# Patient Record
Sex: FEMALE | Race: WHITE | NOT HISPANIC OR LATINO | URBAN - METROPOLITAN AREA
[De-identification: names, ages, dates, MRNs, and addresses within clinical notes are randomized per-mention and may not be internally consistent; named-entity substitution may affect disease eponyms.]

---

## 2017-08-16 ENCOUNTER — OUTPATIENT (OUTPATIENT)
Dept: OUTPATIENT SERVICES | Facility: HOSPITAL | Age: 42
LOS: 1 days | End: 2017-08-16
Payer: MEDICARE

## 2017-08-16 VITALS
OXYGEN SATURATION: 98 % | SYSTOLIC BLOOD PRESSURE: 129 MMHG | RESPIRATION RATE: 16 BRPM | WEIGHT: 199.08 LBS | DIASTOLIC BLOOD PRESSURE: 82 MMHG | HEIGHT: 68 IN | TEMPERATURE: 98 F | HEART RATE: 87 BPM

## 2017-08-16 DIAGNOSIS — K02.62 DENTAL CARIES ON SMOOTH SURFACE PENETRATING INTO DENTIN: ICD-10-CM

## 2017-08-16 DIAGNOSIS — Z01.818 ENCOUNTER FOR OTHER PREPROCEDURAL EXAMINATION: ICD-10-CM

## 2017-08-16 DIAGNOSIS — K05.6 PERIODONTAL DISEASE, UNSPECIFIED: ICD-10-CM

## 2017-08-16 DIAGNOSIS — Z98.890 OTHER SPECIFIED POSTPROCEDURAL STATES: Chronic | ICD-10-CM

## 2017-08-16 PROCEDURE — G0463: CPT

## 2017-08-16 RX ORDER — BENZTROPINE MESYLATE 1 MG
0.5 TABLET ORAL
Qty: 0 | Refills: 0 | COMMUNITY

## 2017-08-16 RX ORDER — LIDOCAINE HCL 20 MG/ML
0.2 VIAL (ML) INJECTION ONCE
Qty: 0 | Refills: 0 | Status: DISCONTINUED | OUTPATIENT
Start: 2017-08-25 | End: 2017-09-09

## 2017-08-16 RX ORDER — SODIUM CHLORIDE 9 MG/ML
3 INJECTION INTRAMUSCULAR; INTRAVENOUS; SUBCUTANEOUS EVERY 8 HOURS
Qty: 0 | Refills: 0 | Status: DISCONTINUED | OUTPATIENT
Start: 2017-08-25 | End: 2017-09-09

## 2017-08-16 NOTE — H&P PST ADULT - NSANTHOSAYNRD_GEN_A_CORE
No. GRACE screening performed.  STOP BANG Legend: 0-2 = LOW Risk; 3-4 = INTERMEDIATE Risk; 5-8 = HIGH Risk

## 2017-08-16 NOTE — H&P PST ADULT - PMH
Anxiety    Dental caries on smooth surface penetrating into dentin    Impulse control disorder in adult    Obesity Anxiety    Dental caries on smooth surface penetrating into dentin    Impulse control disorder in adult    Obesity (BMI 30.0-34.9)

## 2017-08-16 NOTE — H&P PST ADULT - HISTORY OF PRESENT ILLNESS
43 y/o f living in group home, presents pre comprehensive dental procedure under general anesthesia.

## 2017-08-24 RX ORDER — OXYCODONE HYDROCHLORIDE 5 MG/1
5 TABLET ORAL ONCE
Qty: 0 | Refills: 0 | Status: DISCONTINUED | OUTPATIENT
Start: 2017-08-25 | End: 2017-08-25

## 2017-08-24 RX ORDER — ONDANSETRON 8 MG/1
4 TABLET, FILM COATED ORAL ONCE
Qty: 0 | Refills: 0 | Status: DISCONTINUED | OUTPATIENT
Start: 2017-08-25 | End: 2017-09-09

## 2017-08-24 RX ORDER — SODIUM CHLORIDE 9 MG/ML
1000 INJECTION, SOLUTION INTRAVENOUS
Qty: 0 | Refills: 0 | Status: DISCONTINUED | OUTPATIENT
Start: 2017-08-25 | End: 2017-09-09

## 2017-08-24 RX ORDER — CELECOXIB 200 MG/1
200 CAPSULE ORAL ONCE
Qty: 0 | Refills: 0 | Status: DISCONTINUED | OUTPATIENT
Start: 2017-08-25 | End: 2017-09-09

## 2017-08-25 ENCOUNTER — OUTPATIENT (OUTPATIENT)
Dept: OUTPATIENT SERVICES | Facility: HOSPITAL | Age: 42
LOS: 1 days | End: 2017-08-25
Payer: MEDICARE

## 2017-08-25 VITALS
OXYGEN SATURATION: 99 % | SYSTOLIC BLOOD PRESSURE: 122 MMHG | HEART RATE: 72 BPM | DIASTOLIC BLOOD PRESSURE: 56 MMHG | RESPIRATION RATE: 14 BRPM

## 2017-08-25 VITALS
HEART RATE: 94 BPM | WEIGHT: 199.08 LBS | RESPIRATION RATE: 16 BRPM | SYSTOLIC BLOOD PRESSURE: 123 MMHG | DIASTOLIC BLOOD PRESSURE: 83 MMHG | HEIGHT: 68 IN | OXYGEN SATURATION: 97 % | TEMPERATURE: 99 F

## 2017-08-25 DIAGNOSIS — K02.62 DENTAL CARIES ON SMOOTH SURFACE PENETRATING INTO DENTIN: ICD-10-CM

## 2017-08-25 DIAGNOSIS — Z01.818 ENCOUNTER FOR OTHER PREPROCEDURAL EXAMINATION: ICD-10-CM

## 2017-08-25 DIAGNOSIS — Z98.890 OTHER SPECIFIED POSTPROCEDURAL STATES: Chronic | ICD-10-CM

## 2017-08-25 DIAGNOSIS — K05.6 PERIODONTAL DISEASE, UNSPECIFIED: ICD-10-CM

## 2017-08-25 PROCEDURE — D2335: CPT

## 2017-08-25 PROCEDURE — D2391: CPT

## 2017-08-25 PROCEDURE — D2330: CPT

## 2017-08-25 PROCEDURE — D1110: CPT

## 2017-08-25 PROCEDURE — D2331: CPT

## 2017-08-25 PROCEDURE — D2332: CPT

## 2017-08-25 PROCEDURE — D4341: CPT

## 2017-08-25 RX ORDER — BENZTROPINE MESYLATE 1 MG
0.5 TABLET ORAL
Qty: 0 | Refills: 0 | COMMUNITY

## 2017-08-25 RX ORDER — BENZTROPINE MESYLATE 1 MG
2 TABLET ORAL
Qty: 0 | Refills: 0 | COMMUNITY

## 2017-08-25 RX ORDER — ACETAMINOPHEN 500 MG
1000 TABLET ORAL ONCE
Qty: 0 | Refills: 0 | Status: COMPLETED | OUTPATIENT
Start: 2017-08-25 | End: 2017-08-25

## 2017-08-25 RX ADMIN — Medication 400 MILLIGRAM(S): at 13:08

## 2017-08-25 NOTE — BRIEF OPERATIVE NOTE - PRE-OP DX
Dental caries extending into dentin  08/25/2017    Active  Caridad Flores  Periodontal disease  08/25/2017    Active  Caridad Flores

## 2017-08-25 NOTE — ASU PATIENT PROFILE, ADULT - PMH
Anxiety    Dental caries on smooth surface penetrating into dentin    Impulse control disorder in adult    Obesity (BMI 30.0-34.9)

## 2017-08-25 NOTE — BRIEF OPERATIVE NOTE - POST-OP DX
Dental caries extending into dentin  08/25/2017    Caridad Ernandez  Dental caries on smooth surface penetrating into dentin  08/25/2017    Caridad Ernandez  Periodontal disease, unspecified  08/25/2017    Caridad Ernandez

## 2017-08-29 ENCOUNTER — TRANSCRIPTION ENCOUNTER (OUTPATIENT)
Age: 42
End: 2017-08-29

## 2017-11-07 ENCOUNTER — TRANSCRIPTION ENCOUNTER (OUTPATIENT)
Age: 42
End: 2017-11-07

## 2018-02-14 ENCOUNTER — TRANSCRIPTION ENCOUNTER (OUTPATIENT)
Age: 43
End: 2018-02-14

## 2019-02-15 PROBLEM — K02.62 DENTAL CARIES ON SMOOTH SURFACE PENETRATING INTO DENTIN: Chronic | Status: ACTIVE | Noted: 2017-08-16

## 2019-02-15 PROBLEM — F41.9 ANXIETY DISORDER, UNSPECIFIED: Chronic | Status: ACTIVE | Noted: 2017-08-16

## 2019-02-15 PROBLEM — F63.9 IMPULSE DISORDER, UNSPECIFIED: Chronic | Status: ACTIVE | Noted: 2017-08-16

## 2019-02-15 PROBLEM — E66.9 OBESITY, UNSPECIFIED: Chronic | Status: ACTIVE | Noted: 2017-08-16

## 2019-02-19 ENCOUNTER — OUTPATIENT (OUTPATIENT)
Dept: OUTPATIENT SERVICES | Facility: HOSPITAL | Age: 44
LOS: 1 days | End: 2019-02-19
Payer: MEDICARE

## 2019-02-19 VITALS
WEIGHT: 199.96 LBS | OXYGEN SATURATION: 100 % | TEMPERATURE: 99 F | HEIGHT: 68 IN | DIASTOLIC BLOOD PRESSURE: 76 MMHG | HEART RATE: 83 BPM | SYSTOLIC BLOOD PRESSURE: 107 MMHG | RESPIRATION RATE: 14 BRPM

## 2019-02-19 DIAGNOSIS — K05.6 PERIODONTAL DISEASE, UNSPECIFIED: ICD-10-CM

## 2019-02-19 DIAGNOSIS — K02.62 DENTAL CARIES ON SMOOTH SURFACE PENETRATING INTO DENTIN: ICD-10-CM

## 2019-02-19 DIAGNOSIS — F41.9 ANXIETY DISORDER, UNSPECIFIED: ICD-10-CM

## 2019-02-19 DIAGNOSIS — Z01.818 ENCOUNTER FOR OTHER PREPROCEDURAL EXAMINATION: ICD-10-CM

## 2019-02-19 DIAGNOSIS — Z98.890 OTHER SPECIFIED POSTPROCEDURAL STATES: Chronic | ICD-10-CM

## 2019-02-19 PROCEDURE — G0463: CPT

## 2019-02-19 RX ORDER — SODIUM CHLORIDE 9 MG/ML
3 INJECTION INTRAMUSCULAR; INTRAVENOUS; SUBCUTANEOUS EVERY 8 HOURS
Qty: 0 | Refills: 0 | Status: DISCONTINUED | OUTPATIENT
Start: 2019-02-22 | End: 2019-03-09

## 2019-02-19 RX ORDER — FAMOTIDINE 10 MG/ML
0 INJECTION INTRAVENOUS
Qty: 0 | Refills: 0 | COMMUNITY

## 2019-02-19 RX ORDER — LIDOCAINE HCL 20 MG/ML
0.2 VIAL (ML) INJECTION ONCE
Qty: 0 | Refills: 0 | Status: DISCONTINUED | OUTPATIENT
Start: 2019-02-22 | End: 2019-03-09

## 2019-02-19 NOTE — H&P PST ADULT - PSY GEN HX ROS MEA POS PC
anxiety anxiety/experiences "chest pain" when extremely anxious, has not happened in over 1-2 months.

## 2019-02-19 NOTE — H&P PST ADULT - HISTORY OF PRESENT ILLNESS
Ms. Garcia is a 43 year old woman with PMH impulse control disorder, anxiety and dental caries who is scheduled for comprehensive dental treatment. Ms. Garcia is a 43 year old woman with PMH impulse control disorder who lives in a group home, anxiety and dental caries who is scheduled for comprehensive dental treatment.

## 2019-02-19 NOTE — H&P PST ADULT - ENMT COMMENTS
finger rub heard bilaterally, no redness/inflammation of mouth/pharynx denies dysphagia, choking or history of aspiration pneumonia

## 2019-02-19 NOTE — H&P PST ADULT - REASON FOR ADMISSION
Deep cleaning and possible removal of R upper molar Deep cleaning of teeth and possible removal of R upper molar

## 2019-02-21 ENCOUNTER — TRANSCRIPTION ENCOUNTER (OUTPATIENT)
Age: 44
End: 2019-02-21

## 2019-02-21 RX ORDER — ONDANSETRON 8 MG/1
4 TABLET, FILM COATED ORAL ONCE
Qty: 0 | Refills: 0 | Status: DISCONTINUED | OUTPATIENT
Start: 2019-02-22 | End: 2019-03-09

## 2019-02-21 RX ORDER — SODIUM CHLORIDE 9 MG/ML
1000 INJECTION, SOLUTION INTRAVENOUS
Qty: 0 | Refills: 0 | Status: DISCONTINUED | OUTPATIENT
Start: 2019-02-22 | End: 2019-03-09

## 2019-02-22 ENCOUNTER — OUTPATIENT (OUTPATIENT)
Dept: OUTPATIENT SERVICES | Facility: HOSPITAL | Age: 44
LOS: 1 days | Discharge: HOME | End: 2019-02-22
Payer: MEDICARE

## 2019-02-22 VITALS
RESPIRATION RATE: 18 BRPM | DIASTOLIC BLOOD PRESSURE: 80 MMHG | HEIGHT: 68 IN | WEIGHT: 199.96 LBS | HEART RATE: 89 BPM | OXYGEN SATURATION: 97 % | SYSTOLIC BLOOD PRESSURE: 116 MMHG | TEMPERATURE: 97 F

## 2019-02-22 VITALS
HEART RATE: 80 BPM | SYSTOLIC BLOOD PRESSURE: 110 MMHG | TEMPERATURE: 99 F | DIASTOLIC BLOOD PRESSURE: 70 MMHG | OXYGEN SATURATION: 98 % | RESPIRATION RATE: 15 BRPM

## 2019-02-22 DIAGNOSIS — Z01.818 ENCOUNTER FOR OTHER PREPROCEDURAL EXAMINATION: ICD-10-CM

## 2019-02-22 DIAGNOSIS — Z98.890 OTHER SPECIFIED POSTPROCEDURAL STATES: Chronic | ICD-10-CM

## 2019-02-22 DIAGNOSIS — K05.6 PERIODONTAL DISEASE, UNSPECIFIED: ICD-10-CM

## 2019-02-22 DIAGNOSIS — K02.62 DENTAL CARIES ON SMOOTH SURFACE PENETRATING INTO DENTIN: ICD-10-CM

## 2019-02-22 PROCEDURE — D2335: CPT

## 2019-02-22 PROCEDURE — D2394: CPT

## 2019-02-22 PROCEDURE — D2332: CPT

## 2019-02-22 PROCEDURE — D4342: CPT

## 2019-02-22 RX ORDER — CITRIC ACID/SODIUM CITRATE 300-500 MG
30 SOLUTION, ORAL ORAL ONCE
Qty: 0 | Refills: 0 | Status: COMPLETED | OUTPATIENT
Start: 2019-02-22 | End: 2019-02-22

## 2019-02-22 RX ADMIN — Medication 15 MILLILITER(S): at 09:08

## 2019-02-22 NOTE — BRIEF OPERATIVE NOTE - PROCEDURE
<<-----Click on this checkbox to enter Procedure Dental exam, with x-ray imaging, dental cleaning, and restoration  02/22/2019    Active  DRE

## 2019-02-22 NOTE — PRE-ANESTHESIA EVALUATION ADULT - LAST ECHOCARDIOGRAM
na Dorsal Nasal Flap Text: The defect edges were debeveled with a #15 scalpel blade.  Given the location of the defect and the proximity to free margins a dorsal nasal flap was deemed most appropriate.  Using a sterile surgical marker, an appropriate dorsal nasal flap was drawn around the defect.    The area thus outlined was incised deep to adipose tissue with a #15 scalpel blade.  The skin margins were undermined to an appropriate distance in all directions utilizing iris scissors.

## 2019-02-22 NOTE — ASU DISCHARGE PLAN (ADULT/PEDIATRIC). - NOTIFY
Swelling that continues/Persistent Nausea and Vomiting/Inability to Tolerate Liquids or Foods/Pain not relieved by Medications/Fever greater than 101/Unable to Urinate/Bleeding that does not stop

## 2019-03-08 DIAGNOSIS — K02.9 DENTAL CARIES, UNSPECIFIED: ICD-10-CM

## 2019-03-08 DIAGNOSIS — F63.9 IMPULSE DISORDER, UNSPECIFIED: ICD-10-CM

## 2019-03-08 DIAGNOSIS — F41.9 ANXIETY DISORDER, UNSPECIFIED: ICD-10-CM

## 2019-10-07 ENCOUNTER — OUTPATIENT (OUTPATIENT)
Dept: OUTPATIENT SERVICES | Facility: HOSPITAL | Age: 44
LOS: 1 days | End: 2019-10-07
Payer: MEDICARE

## 2019-10-07 VITALS
OXYGEN SATURATION: 97 % | TEMPERATURE: 98 F | SYSTOLIC BLOOD PRESSURE: 118 MMHG | DIASTOLIC BLOOD PRESSURE: 79 MMHG | WEIGHT: 201.06 LBS | HEART RATE: 78 BPM | HEIGHT: 68 IN | RESPIRATION RATE: 18 BRPM

## 2019-10-07 DIAGNOSIS — Z98.890 OTHER SPECIFIED POSTPROCEDURAL STATES: Chronic | ICD-10-CM

## 2019-10-07 DIAGNOSIS — K02.9 DENTAL CARIES, UNSPECIFIED: ICD-10-CM

## 2019-10-07 DIAGNOSIS — K02.62 DENTAL CARIES ON SMOOTH SURFACE PENETRATING INTO DENTIN: ICD-10-CM

## 2019-10-07 DIAGNOSIS — Z01.818 ENCOUNTER FOR OTHER PREPROCEDURAL EXAMINATION: ICD-10-CM

## 2019-10-07 DIAGNOSIS — K05.6 PERIODONTAL DISEASE, UNSPECIFIED: ICD-10-CM

## 2019-10-07 LAB
ANION GAP SERPL CALC-SCNC: 12 MMOL/L — SIGNIFICANT CHANGE UP (ref 5–17)
BUN SERPL-MCNC: 10 MG/DL — SIGNIFICANT CHANGE UP (ref 7–23)
CALCIUM SERPL-MCNC: 9.1 MG/DL — SIGNIFICANT CHANGE UP (ref 8.4–10.5)
CHLORIDE SERPL-SCNC: 105 MMOL/L — SIGNIFICANT CHANGE UP (ref 96–108)
CO2 SERPL-SCNC: 21 MMOL/L — LOW (ref 22–31)
CREAT SERPL-MCNC: 0.92 MG/DL — SIGNIFICANT CHANGE UP (ref 0.5–1.3)
GLUCOSE SERPL-MCNC: 90 MG/DL — SIGNIFICANT CHANGE UP (ref 70–99)
HCT VFR BLD CALC: 38.4 % — SIGNIFICANT CHANGE UP (ref 34.5–45)
HGB BLD-MCNC: 12.1 G/DL — SIGNIFICANT CHANGE UP (ref 11.5–15.5)
MCHC RBC-ENTMCNC: 29 PG — SIGNIFICANT CHANGE UP (ref 27–34)
MCHC RBC-ENTMCNC: 31.5 GM/DL — LOW (ref 32–36)
MCV RBC AUTO: 92.1 FL — SIGNIFICANT CHANGE UP (ref 80–100)
PLATELET # BLD AUTO: 331 K/UL — SIGNIFICANT CHANGE UP (ref 150–400)
POTASSIUM SERPL-MCNC: 4.1 MMOL/L — SIGNIFICANT CHANGE UP (ref 3.5–5.3)
POTASSIUM SERPL-SCNC: 4.1 MMOL/L — SIGNIFICANT CHANGE UP (ref 3.5–5.3)
RBC # BLD: 4.17 M/UL — SIGNIFICANT CHANGE UP (ref 3.8–5.2)
RBC # FLD: 13.6 % — SIGNIFICANT CHANGE UP (ref 10.3–14.5)
SODIUM SERPL-SCNC: 138 MMOL/L — SIGNIFICANT CHANGE UP (ref 135–145)
WBC # BLD: 7.22 K/UL — SIGNIFICANT CHANGE UP (ref 3.8–10.5)
WBC # FLD AUTO: 7.22 K/UL — SIGNIFICANT CHANGE UP (ref 3.8–10.5)

## 2019-10-07 PROCEDURE — 85027 COMPLETE CBC AUTOMATED: CPT

## 2019-10-07 PROCEDURE — G0463: CPT

## 2019-10-07 PROCEDURE — 80048 BASIC METABOLIC PNL TOTAL CA: CPT

## 2019-10-07 RX ORDER — BENZTROPINE MESYLATE 1 MG
2 TABLET ORAL
Qty: 0 | Refills: 0 | DISCHARGE

## 2019-10-07 RX ORDER — OMEGA-3 ACID ETHYL ESTERS 1 G
1 CAPSULE ORAL
Qty: 0 | Refills: 0 | DISCHARGE

## 2019-10-07 RX ORDER — BENZTROPINE MESYLATE 1 MG
0.5 TABLET ORAL
Qty: 0 | Refills: 0 | DISCHARGE

## 2019-10-07 RX ORDER — BENZTROPINE MESYLATE 1 MG
1 TABLET ORAL
Qty: 0 | Refills: 0 | DISCHARGE

## 2019-10-07 RX ORDER — BENZTROPINE MESYLATE 1 MG
2.5 TABLET ORAL
Qty: 0 | Refills: 0 | DISCHARGE

## 2019-10-07 NOTE — H&P PST ADULT - NSICDXPROBLEM_GEN_ALL_CORE_FT
PROBLEM DIAGNOSES  Problem: Dental caries  Assessment and Plan: comprehensive dental eatment under anesthesia PROBLEM DIAGNOSES  Problem: Dental caries  Assessment and Plan: comprehensive dental treatment under anesthesia  pt will sign her own consent

## 2019-10-07 NOTE — H&P PST ADULT - HISTORY OF PRESENT ILLNESS
This is a 44 year old female who has two loose teeth on bottom.  Pt has had dental surgery in the past in 2016 This is a 44 year old female who has two loose teeth on bottom.  Pt has had dental surgery in the past in 2016.  Pt states her two front teeth on bottom are loose and need to be removed.  Now scheduled for comprehensive dental treatment on 10-11-19

## 2019-10-07 NOTE — H&P PST ADULT - NSICDXPASTSURGICALHX_GEN_ALL_CORE_FT
PAST SURGICAL HISTORY:  H/O arthroscopy of knee 2012    H/O arthroscopy of knee 9-2019  left wearing brace to help foot turn inward

## 2019-10-07 NOTE — H&P PST ADULT - NSICDXPASTMEDICALHX_GEN_ALL_CORE_FT
PAST MEDICAL HISTORY:  Adjustment disorder with disturbance of conduct    Anxiety     Astigmatism of eye both eyes    Autism     Dental caries on smooth surface penetrating into dentin     Impulse control disorder in adult     Intellectual disability     Mood disorder     Obesity (BMI 30.0-34.9)     Pervasive developmental disorder     Self-injurious behavior not seen at pst    Tear of meniscus of knee

## 2019-10-10 ENCOUNTER — TRANSCRIPTION ENCOUNTER (OUTPATIENT)
Age: 44
End: 2019-10-10

## 2019-10-11 ENCOUNTER — OUTPATIENT (OUTPATIENT)
Dept: OUTPATIENT SERVICES | Facility: HOSPITAL | Age: 44
LOS: 1 days | End: 2019-10-11
Payer: MEDICARE

## 2019-10-11 VITALS
TEMPERATURE: 98 F | WEIGHT: 201.06 LBS | HEART RATE: 79 BPM | RESPIRATION RATE: 18 BRPM | DIASTOLIC BLOOD PRESSURE: 81 MMHG | HEIGHT: 68 IN | OXYGEN SATURATION: 100 % | SYSTOLIC BLOOD PRESSURE: 138 MMHG

## 2019-10-11 VITALS
DIASTOLIC BLOOD PRESSURE: 74 MMHG | RESPIRATION RATE: 16 BRPM | OXYGEN SATURATION: 98 % | SYSTOLIC BLOOD PRESSURE: 114 MMHG | HEART RATE: 80 BPM

## 2019-10-11 DIAGNOSIS — K02.62 DENTAL CARIES ON SMOOTH SURFACE PENETRATING INTO DENTIN: ICD-10-CM

## 2019-10-11 DIAGNOSIS — Z98.890 OTHER SPECIFIED POSTPROCEDURAL STATES: Chronic | ICD-10-CM

## 2019-10-11 DIAGNOSIS — K05.6 PERIODONTAL DISEASE, UNSPECIFIED: ICD-10-CM

## 2019-10-11 PROCEDURE — D1110: CPT

## 2019-10-11 PROCEDURE — D4341: CPT

## 2019-10-11 PROCEDURE — D2335: CPT

## 2019-10-11 PROCEDURE — D7210: CPT

## 2019-10-11 PROCEDURE — C1889: CPT

## 2019-10-11 RX ORDER — SODIUM CHLORIDE 9 MG/ML
1000 INJECTION, SOLUTION INTRAVENOUS
Refills: 0 | Status: DISCONTINUED | OUTPATIENT
Start: 2019-10-11 | End: 2019-10-28

## 2019-10-11 RX ORDER — ONDANSETRON 8 MG/1
4 TABLET, FILM COATED ORAL ONCE
Refills: 0 | Status: DISCONTINUED | OUTPATIENT
Start: 2019-10-11 | End: 2019-10-28

## 2019-10-11 NOTE — ASU DISCHARGE PLAN (ADULT/PEDIATRIC) - ASU DC SPECIAL INSTRUCTIONSFT
comprehensive dental tx under ga performed    tylenol prn pain    return to routine activity tomorrow    see Dr Flores in 1-2 weeks for a follow up

## 2019-10-11 NOTE — ASU PATIENT PROFILE, ADULT - PMH
Adjustment disorder  with disturbance of conduct  Anxiety    Astigmatism of eye  both eyes  Autism    Dental caries on smooth surface penetrating into dentin    Impulse control disorder in adult    Intellectual disability    Mood disorder    Obesity (BMI 30.0-34.9)    Pervasive developmental disorder    Self-injurious behavior  not seen at pst  Tear of meniscus of knee

## 2019-10-11 NOTE — ASU PATIENT PROFILE, ADULT - PSH
H/O arthroscopy of knee  9-2019  left wearing brace to help foot turn inward  H/O arthroscopy of knee  2012

## 2022-01-05 ENCOUNTER — TRANSCRIPTION ENCOUNTER (OUTPATIENT)
Age: 47
End: 2022-01-05

## 2022-02-21 NOTE — H&P PST ADULT - NS PRO ABUSE SCREEN SUSPICION NEGLECT YN
Please let Macy know Dexa shows osteopenia, which is early bone loss.  advise daily weight bearing exercise, ensure that you are getting 4568-8740 mg of calcium in your diet + vit D ~ 1000 IU daily    Repeat in 2 years    no

## 2022-07-23 ENCOUNTER — NON-APPOINTMENT (OUTPATIENT)
Age: 47
End: 2022-07-23

## 2023-03-30 PROBLEM — F84.0 AUTISTIC DISORDER: Chronic | Status: ACTIVE | Noted: 2019-10-07

## 2023-03-30 PROBLEM — F84.9 PERVASIVE DEVELOPMENTAL DISORDER, UNSPECIFIED: Chronic | Status: ACTIVE | Noted: 2019-10-07

## 2023-03-30 PROBLEM — Z72.89 OTHER PROBLEMS RELATED TO LIFESTYLE: Chronic | Status: ACTIVE | Noted: 2019-10-07

## 2023-03-30 PROBLEM — F39 UNSPECIFIED MOOD [AFFECTIVE] DISORDER: Chronic | Status: ACTIVE | Noted: 2019-10-07

## 2023-03-30 PROBLEM — H52.209 UNSPECIFIED ASTIGMATISM, UNSPECIFIED EYE: Chronic | Status: ACTIVE | Noted: 2019-10-07

## 2023-03-30 PROBLEM — S83.209A UNSPECIFIED TEAR OF UNSPECIFIED MENISCUS, CURRENT INJURY, UNSPECIFIED KNEE, INITIAL ENCOUNTER: Chronic | Status: ACTIVE | Noted: 2019-10-07

## 2023-03-30 PROBLEM — F79 UNSPECIFIED INTELLECTUAL DISABILITIES: Chronic | Status: ACTIVE | Noted: 2019-10-07

## 2023-03-30 PROBLEM — F43.20 ADJUSTMENT DISORDER, UNSPECIFIED: Chronic | Status: ACTIVE | Noted: 2019-10-07

## 2023-04-07 ENCOUNTER — OUTPATIENT (OUTPATIENT)
Dept: OUTPATIENT SERVICES | Facility: HOSPITAL | Age: 48
LOS: 1 days | End: 2023-04-07
Payer: MEDICARE

## 2023-04-07 VITALS
SYSTOLIC BLOOD PRESSURE: 122 MMHG | RESPIRATION RATE: 20 BRPM | TEMPERATURE: 98 F | DIASTOLIC BLOOD PRESSURE: 84 MMHG | WEIGHT: 179.02 LBS | HEIGHT: 68 IN | OXYGEN SATURATION: 98 % | HEART RATE: 64 BPM

## 2023-04-07 DIAGNOSIS — Z98.890 OTHER SPECIFIED POSTPROCEDURAL STATES: Chronic | ICD-10-CM

## 2023-04-07 DIAGNOSIS — K05.6 PERIODONTAL DISEASE, UNSPECIFIED: ICD-10-CM

## 2023-04-07 DIAGNOSIS — K02.62 DENTAL CARIES ON SMOOTH SURFACE PENETRATING INTO DENTIN: ICD-10-CM

## 2023-04-07 DIAGNOSIS — Z01.818 ENCOUNTER FOR OTHER PREPROCEDURAL EXAMINATION: ICD-10-CM

## 2023-04-07 LAB
ANION GAP SERPL CALC-SCNC: 13 MMOL/L — SIGNIFICANT CHANGE UP (ref 5–17)
BUN SERPL-MCNC: 14 MG/DL — SIGNIFICANT CHANGE UP (ref 7–23)
CALCIUM SERPL-MCNC: 9.7 MG/DL — SIGNIFICANT CHANGE UP (ref 8.4–10.5)
CHLORIDE SERPL-SCNC: 106 MMOL/L — SIGNIFICANT CHANGE UP (ref 96–108)
CO2 SERPL-SCNC: 22 MMOL/L — SIGNIFICANT CHANGE UP (ref 22–31)
CREAT SERPL-MCNC: 0.86 MG/DL — SIGNIFICANT CHANGE UP (ref 0.5–1.3)
EGFR: 83 ML/MIN/1.73M2 — SIGNIFICANT CHANGE UP
GLUCOSE SERPL-MCNC: 81 MG/DL — SIGNIFICANT CHANGE UP (ref 70–99)
HCT VFR BLD CALC: 40.3 % — SIGNIFICANT CHANGE UP (ref 34.5–45)
HGB BLD-MCNC: 12.6 G/DL — SIGNIFICANT CHANGE UP (ref 11.5–15.5)
MCHC RBC-ENTMCNC: 28.7 PG — SIGNIFICANT CHANGE UP (ref 27–34)
MCHC RBC-ENTMCNC: 31.3 GM/DL — LOW (ref 32–36)
MCV RBC AUTO: 91.8 FL — SIGNIFICANT CHANGE UP (ref 80–100)
NRBC # BLD: 0 /100 WBCS — SIGNIFICANT CHANGE UP (ref 0–0)
PLATELET # BLD AUTO: 280 K/UL — SIGNIFICANT CHANGE UP (ref 150–400)
POTASSIUM SERPL-MCNC: 4.2 MMOL/L — SIGNIFICANT CHANGE UP (ref 3.5–5.3)
POTASSIUM SERPL-SCNC: 4.2 MMOL/L — SIGNIFICANT CHANGE UP (ref 3.5–5.3)
RBC # BLD: 4.39 M/UL — SIGNIFICANT CHANGE UP (ref 3.8–5.2)
RBC # FLD: 13.6 % — SIGNIFICANT CHANGE UP (ref 10.3–14.5)
SODIUM SERPL-SCNC: 141 MMOL/L — SIGNIFICANT CHANGE UP (ref 135–145)
WBC # BLD: 6.76 K/UL — SIGNIFICANT CHANGE UP (ref 3.8–10.5)
WBC # FLD AUTO: 6.76 K/UL — SIGNIFICANT CHANGE UP (ref 3.8–10.5)

## 2023-04-07 PROCEDURE — 36415 COLL VENOUS BLD VENIPUNCTURE: CPT

## 2023-04-07 PROCEDURE — 80048 BASIC METABOLIC PNL TOTAL CA: CPT

## 2023-04-07 PROCEDURE — G0463: CPT

## 2023-04-07 PROCEDURE — 85027 COMPLETE CBC AUTOMATED: CPT

## 2023-04-07 RX ORDER — LIDOCAINE HCL 20 MG/ML
0.2 VIAL (ML) INJECTION ONCE
Refills: 0 | Status: DISCONTINUED | OUTPATIENT
Start: 2023-04-28 | End: 2023-05-13

## 2023-04-07 RX ORDER — NYSTATIN CREAM 100000 [USP'U]/G
1 CREAM TOPICAL
Qty: 0 | Refills: 0 | DISCHARGE

## 2023-04-07 RX ORDER — BENZTROPINE MESYLATE 1 MG
2.5 TABLET ORAL
Qty: 0 | Refills: 0 | DISCHARGE

## 2023-04-07 RX ORDER — SODIUM CHLORIDE 9 MG/ML
1000 INJECTION, SOLUTION INTRAVENOUS
Refills: 0 | Status: DISCONTINUED | OUTPATIENT
Start: 2023-04-28 | End: 2023-05-13

## 2023-04-07 NOTE — H&P PST ADULT - NSANTHOSAYNRD_GEN_A_CORE
No. GARCE screening performed.  STOP BANG Legend: 0-2 = LOW Risk; 3-4 = INTERMEDIATE Risk; 5-8 = HIGH Risk

## 2023-04-07 NOTE — H&P PST ADULT - PROBLEM SELECTOR PLAN 1
Scheduled for comprehensive dental treatment under anesthesia   preop instruction provided verbally and in written, pt and staff verbalized understanding   ucg on admit

## 2023-04-07 NOTE — H&P PST ADULT - ASSESSMENT
DASI SCORE  DASI ACTIVITY  MALLAMPATI 57 DASI SCORE 6.69  DASI ACTIVITY- walking, goes to day program, able to walk uphill without SOB  MALLAMPATI 2  PATIENT IS SCHEDULED FOR COMPREHENSIVE DENTAL EXAM UNDER ANESTHESIA

## 2023-04-07 NOTE — H&P PST ADULT - NSICDXPASTMEDICALHX_GEN_ALL_CORE_FT
PAST MEDICAL HISTORY:  Adjustment disorder with disturbance of conduct    Anxiety     Astigmatism of eye both eyes    Autism     Dental caries on smooth surface penetrating into dentin     Impulse control disorder in adult     Intellectual disability     Mood disorder     Obesity (BMI 30.0-34.9)     Pervasive developmental disorder     Self-injurious behavior not seen at pst    Tear of meniscus of knee      PAST MEDICAL HISTORY:  Adjustment disorder with disturbance of conduct    Anxiety     Astigmatism of eye both eyes    Autism     Dental caries on smooth surface penetrating into dentin     History of migraine headaches     Impulse control disorder in adult     Intellectual disability     Mood disorder     Obesity (BMI 30.0-34.9)     Pervasive developmental disorder     Self-injurious behavior not seen at pst    Tear of meniscus of knee

## 2023-04-07 NOTE — H&P PST ADULT - NSICDXPROCEDURE_GEN_ALL_CORE_FT
PROCEDURES:  Simple dental restoration 07-Apr-2023 14:10:42 peridontal disease/ dental caries smooth surface penetration into dentin Kayla Sorto

## 2023-04-07 NOTE — H&P PST ADULT - HISTORY OF PRESENT ILLNESS
47 year old female with mild intellectual disability, accompanied with group home aide, presents for preop testing for scheduled comprehensive dental treatment under anesthesia on 4/28/2023. Her history significant for Autism, mood disorder, impulse control disorder, migraine headaches.

## 2023-04-07 NOTE — H&P PST ADULT - NSANTHGENDERRD_ENT_A_CORE
If not already tried, can first try taking it with food.      Other option, can decrease to 200 mg daily with or without food (see which way is better tolerated), thereafter if well-tolerated after 2 weeks can increase to 200 mg 1 day alternating the next day with 200 mg twice a day, thereafter if well-tolerated after 2 weeks can increase to 200 mg twice a day.  Sometimes patients can develop tolerance to Plaquenil with time hence, the above regimen suggested.    If still experiencing nausea or significant fatigue even with low-dose Plaquenil (200 mg daily) options include: Continuing this medication with an antinausea medication to take daily as needed or switching to methotrexate.  Please send patient a handout on methotrexate.      Feel better!           No

## 2023-04-15 ENCOUNTER — EMERGENCY (EMERGENCY)
Facility: HOSPITAL | Age: 48
LOS: 1 days | Discharge: HOME | End: 2023-04-15
Attending: INTERNAL MEDICINE | Admitting: INTERNAL MEDICINE
Payer: MEDICARE

## 2023-04-15 VITALS
DIASTOLIC BLOOD PRESSURE: 94 MMHG | SYSTOLIC BLOOD PRESSURE: 144 MMHG | HEART RATE: 73 BPM | HEIGHT: 68 IN | RESPIRATION RATE: 16 BRPM | OXYGEN SATURATION: 99 % | TEMPERATURE: 98 F | WEIGHT: 181 LBS

## 2023-04-15 DIAGNOSIS — Z98.890 OTHER SPECIFIED POSTPROCEDURAL STATES: Chronic | ICD-10-CM

## 2023-04-15 LAB
ALBUMIN SERPL ELPH-MCNC: 3.3 G/DL — SIGNIFICANT CHANGE UP (ref 3.3–5)
ALP SERPL-CCNC: 82 U/L — SIGNIFICANT CHANGE UP (ref 40–120)
ALT FLD-CCNC: 22 U/L — SIGNIFICANT CHANGE UP (ref 12–78)
ANION GAP SERPL CALC-SCNC: 3 MMOL/L — LOW (ref 5–17)
AST SERPL-CCNC: 33 U/L — SIGNIFICANT CHANGE UP (ref 15–37)
BASOPHILS # BLD AUTO: 0.05 K/UL — SIGNIFICANT CHANGE UP (ref 0–0.2)
BASOPHILS NFR BLD AUTO: 0.7 % — SIGNIFICANT CHANGE UP (ref 0–2)
BILIRUB SERPL-MCNC: 0.3 MG/DL — SIGNIFICANT CHANGE UP (ref 0.2–1.2)
BUN SERPL-MCNC: 9 MG/DL — SIGNIFICANT CHANGE UP (ref 7–23)
CALCIUM SERPL-MCNC: 8.7 MG/DL — SIGNIFICANT CHANGE UP (ref 8.5–10.1)
CHLORIDE SERPL-SCNC: 111 MMOL/L — HIGH (ref 96–108)
CO2 SERPL-SCNC: 25 MMOL/L — SIGNIFICANT CHANGE UP (ref 22–31)
CREAT SERPL-MCNC: 0.9 MG/DL — SIGNIFICANT CHANGE UP (ref 0.5–1.3)
D DIMER BLD IA.RAPID-MCNC: 273 NG/ML DDU — HIGH
EGFR: 79 ML/MIN/1.73M2 — SIGNIFICANT CHANGE UP
EOSINOPHIL # BLD AUTO: 0.34 K/UL — SIGNIFICANT CHANGE UP (ref 0–0.5)
EOSINOPHIL NFR BLD AUTO: 4.5 % — SIGNIFICANT CHANGE UP (ref 0–6)
FLUAV AG NPH QL: SIGNIFICANT CHANGE UP
FLUBV AG NPH QL: SIGNIFICANT CHANGE UP
GLUCOSE SERPL-MCNC: 92 MG/DL — SIGNIFICANT CHANGE UP (ref 70–99)
HCG SERPL-ACNC: <1 MIU/ML — SIGNIFICANT CHANGE UP
HCT VFR BLD CALC: 35.1 % — SIGNIFICANT CHANGE UP (ref 34.5–45)
HGB BLD-MCNC: 11.3 G/DL — LOW (ref 11.5–15.5)
IMM GRANULOCYTES NFR BLD AUTO: 0.1 % — SIGNIFICANT CHANGE UP (ref 0–0.9)
LYMPHOCYTES # BLD AUTO: 3.36 K/UL — HIGH (ref 1–3.3)
LYMPHOCYTES # BLD AUTO: 44.2 % — HIGH (ref 13–44)
MCHC RBC-ENTMCNC: 29.4 PG — SIGNIFICANT CHANGE UP (ref 27–34)
MCHC RBC-ENTMCNC: 32.2 GM/DL — SIGNIFICANT CHANGE UP (ref 32–36)
MCV RBC AUTO: 91.2 FL — SIGNIFICANT CHANGE UP (ref 80–100)
MONOCYTES # BLD AUTO: 0.69 K/UL — SIGNIFICANT CHANGE UP (ref 0–0.9)
MONOCYTES NFR BLD AUTO: 9.1 % — SIGNIFICANT CHANGE UP (ref 2–14)
NEUTROPHILS # BLD AUTO: 3.15 K/UL — SIGNIFICANT CHANGE UP (ref 1.8–7.4)
NEUTROPHILS NFR BLD AUTO: 41.4 % — LOW (ref 43–77)
NRBC # BLD: 0 /100 WBCS — SIGNIFICANT CHANGE UP (ref 0–0)
PLATELET # BLD AUTO: 259 K/UL — SIGNIFICANT CHANGE UP (ref 150–400)
POTASSIUM SERPL-MCNC: 5 MMOL/L — SIGNIFICANT CHANGE UP (ref 3.5–5.3)
POTASSIUM SERPL-SCNC: 5 MMOL/L — SIGNIFICANT CHANGE UP (ref 3.5–5.3)
PROT SERPL-MCNC: 7.1 G/DL — SIGNIFICANT CHANGE UP (ref 6–8.3)
RBC # BLD: 3.85 M/UL — SIGNIFICANT CHANGE UP (ref 3.8–5.2)
RBC # FLD: 13.7 % — SIGNIFICANT CHANGE UP (ref 10.3–14.5)
RSV RNA NPH QL NAA+NON-PROBE: SIGNIFICANT CHANGE UP
SARS-COV-2 RNA SPEC QL NAA+PROBE: SIGNIFICANT CHANGE UP
SODIUM SERPL-SCNC: 139 MMOL/L — SIGNIFICANT CHANGE UP (ref 135–145)
TROPONIN I, HIGH SENSITIVITY RESULT: 29.3 NG/L — SIGNIFICANT CHANGE UP
WBC # BLD: 7.6 K/UL — SIGNIFICANT CHANGE UP (ref 3.8–10.5)
WBC # FLD AUTO: 7.6 K/UL — SIGNIFICANT CHANGE UP (ref 3.8–10.5)

## 2023-04-15 PROCEDURE — 71045 X-RAY EXAM CHEST 1 VIEW: CPT | Mod: 26

## 2023-04-15 PROCEDURE — 99285 EMERGENCY DEPT VISIT HI MDM: CPT

## 2023-04-15 PROCEDURE — 93010 ELECTROCARDIOGRAM REPORT: CPT

## 2023-04-15 RX ORDER — SODIUM CHLORIDE 9 MG/ML
1000 INJECTION INTRAMUSCULAR; INTRAVENOUS; SUBCUTANEOUS ONCE
Refills: 0 | Status: COMPLETED | OUTPATIENT
Start: 2023-04-15 | End: 2023-04-15

## 2023-04-15 RX ADMIN — SODIUM CHLORIDE 1000 MILLILITER(S): 9 INJECTION INTRAMUSCULAR; INTRAVENOUS; SUBCUTANEOUS at 23:05

## 2023-04-15 NOTE — ED PROVIDER NOTE - CARE PROVIDER_API CALL
Jayden Fischer)  Internal Medicine  [address] [place] [address]  Phone: [phone]  Fax: [phone]  Follow Up Time: 1-3 Days

## 2023-04-15 NOTE — ED ADULT NURSE NOTE - NSFALLRSKASSESSTYPE_ED_ALL_ED
Date of Service: 10/16/2018    HISTORY OF PRESENT ILLNESS:  Delia is a 74-year-old female with multiple medical problems who presents today for a follow-up appointment. Her abdominal pain has subsided significantly. She was seen by Dr. Jacklyn Teran NP and they did switch her from Ranitidine to Omeprazole. She does think though that being off the Victoza might have helped her as she is off the Victoza now. Her A1c was 5.7 and this is without any medications now for diabetes. She has lost a significant amount of weight and changed her diet. Her blood pressure is good. She is only on Cozaar 50 mg a day and her blood pressure is 112/56. She is no longer having periods of lightheadedness and dizziness as she was. She was on Lexapro. Initially we put her on that for her stomach as well as for anxiety and depression. It does seem to have helped her. She has a history of gout and every now and then her feet and ankles do bother her. She is on Allopurinol. We did not check a uric acid level. She does see Dr. Emery Wright in regards to her coronary artery disease. She also sees Dr. Maki Ozuna in regards to her chronic kidney disease. She has a UTI and is on medication. She is going to have a UA checked after she has finished the antibiotics. She has a history of chronic anemia, which is related to chronic disease, probably partially her renal disease. She is on B12 and an iron supplementation also. CURRENT MEDICATIONS:  1. Allopurinol 100 mg b.i.d.  2.  Lasix 20 mg daily. 3.  Cozaar 50 mg a day. 4.  Lopressor 25 mg. She takes a half a tablet daily. 5.  Zocor 40 mg daily. 6.  Lexapro 10 mg a day. 7.  Fiber tablets. 8.  Prilosec 20 mg daily. 9.  Ditropan 5 mg b.i.d. 10.  Eliquis 5 mg b.i.d.  11.  Potassium 10 mEq daily. 12.  Multivitamin daily. 13.  Flonase p.r.n. 14.  Sulfa. PHYSICAL EXAMINATION:  VITAL SIGNS:  Blood pressure 112/56, heart rate 45, pulse ox 98%. Weight is 76.6 kilograms.   GENERAL: The patient is awake and alert, in no distress, oriented x3. HEENT:  Head normocephalic, atraumatic. Eyes:  Conjunctivae pink. Sclerae clear. EOMI. PERRL. Oropharynx clear. NECK:  Supple. No lymphadenopathy. No JVD. LUNGS:  Clear to auscultation, no crackles or wheezes. HEART:  Bradycardic, S1, S2.  A 2/6 systolic ejection at the left sternal border. ABDOMEN:  Bowel sounds present, soft, nontender, nondistended. EXTREMITIES:  There is trace pretibial edema, right leg greater than left. She does have a large cyst on her right ankle, which is nontender. ASSESSMENT AND PLAN:  1. History of gout. She thinks she is having symptoms every now and then. Check a uric acid level. Continue Allopurinol. 2.  Chronic kidney disease stage III. Avoid nephrotoxins and follow up with Dr. Nathalie Coleman  3. Anemia of chronic disease. Continue B12 and Iron supplementation. Follow up with Dr. Nathalie Coleman in regards to her kidney problems. Monitor her CBC. 4.  Abdominal pain. This is almost completely resolved. She might have had a little gastritis. She is on Omeprazole. She is going to stop the Ranitidine. Suspect that the Victoza was causing some of her stomach upset as well. 5.  Depression, stable. She is doing well on the Lexapro. Continue Lexapro. 6.  Type 2 diabetes mellitus. This seems to have resolved with her diet. She is no longer on any medication and her A1c is only 5.7. Check it again in 3-4 months. If it still remains so low, we are going to be able to take diabetes off of her problem list.  7.  Health maintenance. The patient was given a flu shot today. Will see her back for follow-up within the next 4 months.       Dictated By: Ana Gomez MD  Signing Provider: MD MAXIMILIANO Villalobos/pily ([id number])  DD: 10/16/2018 16:09:37 TD: 10/18/2018 04:31:07    Copy Sent To: Initial (On Arrival)

## 2023-04-15 NOTE — ED PROVIDER NOTE - CLINICAL SUMMARY MEDICAL DECISION MAKING FREE TEXT BOX
atypical CP, elevated D-Dimer, labs xray ekg enzymes and CT chest angio all negative, stable for discharge with O/P referral given

## 2023-04-15 NOTE — ED ADULT NURSE NOTE - OBJECTIVE STATEMENT
Pt presented to ED c/o mid sternal chest pain and some SOB.  Pt states it feels like an elephant is sitting on her chest.  Pt does not appear to be in any distress.  From group home, aid at bedside.  No n/v/d.  EKG completed, placed on cardiac monitor- NSR, O2 sat 98% on room air.  Maintain comfort and safety.

## 2023-04-15 NOTE — ED PROVIDER NOTE - NSICDXPASTMEDICALHX_GEN_ALL_CORE_FT
PAST MEDICAL HISTORY:  Adjustment disorder with disturbance of conduct    Anxiety     Astigmatism of eye both eyes    Autism     Dental caries on smooth surface penetrating into dentin     History of migraine headaches     Impulse control disorder in adult     Intellectual disability     Mood disorder     Obesity (BMI 30.0-34.9)     Pervasive developmental disorder     Self-injurious behavior not seen at pst    Tear of meniscus of knee

## 2023-04-15 NOTE — ED PROVIDER NOTE - SIGNIFICANT NEGATIVE FINDINGS
no headache, no exertional chest pain, no SOB, no diaphoresis, no palpitations, no n/v, no radiation, no fever, no chills,  no urinary symptoms, no bleeding. no neuro changes.

## 2023-04-15 NOTE — ED PROVIDER NOTE - OBJECTIVE STATEMENT
Patient brought in by ambulance from longterm as reported having chest pain  chest pain 46 y/o female C/C Patient brought in by ambulance from detention as reported having chest pain  she is resting comfortably and notes that the pain is resolved. no headache, no exertional chest pain, no SOB, no diaphoresis, no palpitations, no n/v, no radiation, no fever, no chills,  no urinary symptoms, no bleeding. no neuro changes.

## 2023-04-15 NOTE — ED ADULT NURSE NOTE - PRIMARY CARE PROVIDER
Seen today s/p colonosocpy with 2 polyps removed . Was also noted to have diverticular disease and bleeding hemorrhoids. Has supplemened fibre intake and augmented water inatke with improvement.  Last seen  regarding a change in bowel habits with constipation,straining and change in calibre of stools. Has noted intermittent hematochezia. Red small volume and at times mixed with stool. Symptoms are chronic Last colonoscopy 2013 No overt bleeding today. No fevers or chills. No night sweats. No weight loss. No flushing. No nausea or vomiting. No travel,sick contacts or new medications. No recent dietary changes. No excessive caffeine intake . No excessive artificial sweetener use including sorbitol and diabetic sweetened foods. No family history IBD,CRC. Has taken trial OTC treatment with
Dr. Ruiz

## 2023-04-15 NOTE — ED PROVIDER NOTE - PATIENT PORTAL LINK FT
You can access the FollowMyHealth Patient Portal offered by [facility] by registering at the following website: [email or web address]. By joining [employer]’s FollowMyHealth portal, you will also be able to view your health information using other applications (apps) compatible with our system.

## 2023-04-16 VITALS
SYSTOLIC BLOOD PRESSURE: 112 MMHG | HEART RATE: 75 BPM | RESPIRATION RATE: 16 BRPM | TEMPERATURE: 98 F | OXYGEN SATURATION: 97 % | DIASTOLIC BLOOD PRESSURE: 72 MMHG

## 2023-04-16 PROBLEM — Z86.69 PERSONAL HISTORY OF OTHER DISEASES OF THE NERVOUS SYSTEM AND SENSE ORGANS: Chronic | Status: ACTIVE | Noted: 2023-04-07

## 2023-04-16 LAB — TROPONIN I, HIGH SENSITIVITY RESULT: 29.8 NG/L — SIGNIFICANT CHANGE UP

## 2023-04-16 PROCEDURE — 87637 SARSCOV2&INF A&B&RSV AMP PRB: CPT

## 2023-04-16 PROCEDURE — 99285 EMERGENCY DEPT VISIT HI MDM: CPT | Mod: 25

## 2023-04-16 PROCEDURE — 93005 ELECTROCARDIOGRAM TRACING: CPT

## 2023-04-16 PROCEDURE — 84484 ASSAY OF TROPONIN QUANT: CPT

## 2023-04-16 PROCEDURE — 84702 CHORIONIC GONADOTROPIN TEST: CPT

## 2023-04-16 PROCEDURE — 71045 X-RAY EXAM CHEST 1 VIEW: CPT

## 2023-04-16 PROCEDURE — 85379 FIBRIN DEGRADATION QUANT: CPT

## 2023-04-16 PROCEDURE — 71275 CT ANGIOGRAPHY CHEST: CPT | Mod: MA

## 2023-04-16 PROCEDURE — 85025 COMPLETE CBC W/AUTO DIFF WBC: CPT

## 2023-04-16 PROCEDURE — 36415 COLL VENOUS BLD VENIPUNCTURE: CPT

## 2023-04-16 PROCEDURE — 80053 COMPREHEN METABOLIC PANEL: CPT

## 2023-04-16 PROCEDURE — 99283 EMERGENCY DEPT VISIT LOW MDM: CPT | Mod: 25

## 2023-04-16 PROCEDURE — 71275 CT ANGIOGRAPHY CHEST: CPT | Mod: 26,MA

## 2023-04-27 ENCOUNTER — TRANSCRIPTION ENCOUNTER (OUTPATIENT)
Age: 48
End: 2023-04-27

## 2023-04-28 ENCOUNTER — TRANSCRIPTION ENCOUNTER (OUTPATIENT)
Age: 48
End: 2023-04-28

## 2023-04-28 ENCOUNTER — OUTPATIENT (OUTPATIENT)
Dept: OUTPATIENT SERVICES | Facility: HOSPITAL | Age: 48
LOS: 1 days | End: 2023-04-28
Payer: MEDICARE

## 2023-04-28 VITALS
SYSTOLIC BLOOD PRESSURE: 123 MMHG | OXYGEN SATURATION: 98 % | HEART RATE: 67 BPM | TEMPERATURE: 97 F | RESPIRATION RATE: 14 BRPM | DIASTOLIC BLOOD PRESSURE: 60 MMHG

## 2023-04-28 VITALS
HEART RATE: 77 BPM | SYSTOLIC BLOOD PRESSURE: 126 MMHG | WEIGHT: 179.02 LBS | HEIGHT: 68 IN | OXYGEN SATURATION: 98 % | DIASTOLIC BLOOD PRESSURE: 85 MMHG | RESPIRATION RATE: 18 BRPM | TEMPERATURE: 99 F

## 2023-04-28 DIAGNOSIS — Z98.890 OTHER SPECIFIED POSTPROCEDURAL STATES: Chronic | ICD-10-CM

## 2023-04-28 DIAGNOSIS — Z01.818 ENCOUNTER FOR OTHER PREPROCEDURAL EXAMINATION: ICD-10-CM

## 2023-04-28 DIAGNOSIS — K05.6 PERIODONTAL DISEASE, UNSPECIFIED: ICD-10-CM

## 2023-04-28 DIAGNOSIS — K02.62 DENTAL CARIES ON SMOOTH SURFACE PENETRATING INTO DENTIN: ICD-10-CM

## 2023-04-28 PROCEDURE — D2335: CPT

## 2023-04-28 PROCEDURE — D2332: CPT

## 2023-04-28 PROCEDURE — D2330: CPT

## 2023-04-28 PROCEDURE — D4341: CPT

## 2023-04-28 PROCEDURE — C1889: CPT

## 2023-04-28 PROCEDURE — D7210: CPT

## 2023-04-28 DEVICE — SURGICEL 2 X 14": Type: IMPLANTABLE DEVICE | Status: FUNCTIONAL

## 2023-04-28 RX ORDER — ONDANSETRON 8 MG/1
4 TABLET, FILM COATED ORAL ONCE
Refills: 0 | Status: COMPLETED | OUTPATIENT
Start: 2023-04-28 | End: 2023-04-28

## 2023-04-28 RX ORDER — LORATADINE 10 MG/1
1 TABLET ORAL
Qty: 0 | Refills: 0 | DISCHARGE

## 2023-04-28 RX ORDER — OMEGA-3 ACID ETHYL ESTERS 1 G
1 CAPSULE ORAL
Qty: 0 | Refills: 0 | DISCHARGE

## 2023-04-28 RX ORDER — BENZTROPINE MESYLATE 1 MG
1 TABLET ORAL
Qty: 0 | Refills: 0 | DISCHARGE

## 2023-04-28 RX ORDER — ARIPIPRAZOLE 15 MG/1
1 TABLET ORAL
Qty: 0 | Refills: 0 | DISCHARGE

## 2023-04-28 RX ADMIN — ONDANSETRON 4 MILLIGRAM(S): 8 TABLET, FILM COATED ORAL at 14:48

## 2023-04-28 NOTE — ASU DISCHARGE PLAN (ADULT/PEDIATRIC) - NS MD DC FALL RISK RISK
For information on Fall & Injury Prevention, visit: [email or web address] OR  [email or web address] OR  https://www.cdc.gov/steadi/patient.html

## 2023-04-28 NOTE — ASU PATIENT PROFILE, ADULT - FALL HARM RISK - UNIVERSAL INTERVENTIONS
Bed in lowest position, wheels locked, appropriate side rails in place/Call bell, personal items and telephone in reach/Instruct patient to call for assistance before getting out of bed or chair/Non-slip footwear when patient is out of bed/[place] to call system/Physically safe environment - no spills, clutter or unnecessary equipment/Purposeful Proactive Rounding/Room/bathroom lighting operational, light cord in reach

## 2023-04-28 NOTE — ASU DISCHARGE PLAN (ADULT/PEDIATRIC) - ASU DC SPECIAL INSTRUCTIONSFT
comprehensive dental treatment under general anesthesia      DENTAL CARIES    TYLENOL prn pain and give tylenol for the next two days for comfort      ANTIBIOTICS WILL BE PRESCRIBED TO PATIENT'S PHARMACY      three  extractions were performed to the lower anterior area, NO STRAW!! for two days and keep head elevated for the next two days and nights, drink lots of water to stay hydrated, KEEP head elevated for the next two days and nights    return to program tomorrow if patient feels well    see DR Flores on Thursday MAY 4 at 11 AM  at [facility], appt is set    ice as tolerated TO THE FACE AS MUCH AS POSSIBLE TODAY TO THE CHIN AREA and upper right!

## 2023-12-12 NOTE — ASU PREOP CHECKLIST - VIA
Patient: Elizabet Butler    Procedure Information       Date/Time: 12/12/23 1315    Scheduled providers: Raphael Franklin DO    Procedure: COLONOSCOPY    Location: [facility]            Relevant Problems   Anesthesia (within normal limits)      Cardiovascular   (+) Atherosclerosis of native coronary artery of native heart without angina pectoris   (+) Benign essential hypertension   (+) Hyperlipidemia associated with type 2 diabetes mellitus (CMS/HCC)   (+) PVD (peripheral vascular disease) (CMS/HCC)      Endocrine   (+) Hypothyroidism in adult   (+) Morbid obesity (CMS/HCC)   (+) Type 2 diabetes mellitus without complication, without long-term current use of insulin (CMS/HCC)      Neuro/Psych   (+) Anxiety   (+) Mild major depression (CMS/HCC)       Clinical information reviewed:   Tobacco  Allergies  Meds   Med Hx  Surg Hx   Fam Hx  Soc Hx        NPO Detail:  NPO/Void Status  Date of Last Liquid: 12/12/23  Time of Last Liquid: 0810  Date of Last Solid: 12/10/23  Time of Last Solid: 1900         Physical Exam    Airway  Mallampati: IV  TM distance: >3 FB  Neck ROM: full     Cardiovascular - normal exam     Dental   (+) upper dentures  Comments: Lower partial   Pulmonary - normal exam     Abdominal            Anesthesia Plan    ASA 2     MAC     The patient is not a current smoker.    intravenous induction   Anesthetic plan and risks discussed with patient.  Use of blood products discussed with patient who consented to blood products.    Plan discussed with CRNA.       ambulate

## 2024-04-28 ENCOUNTER — EMERGENCY (EMERGENCY)
Facility: HOSPITAL | Age: 49
LOS: 1 days | Discharge: HOME | End: 2024-04-28
Attending: EMERGENCY MEDICINE | Admitting: EMERGENCY MEDICINE
Payer: MEDICARE

## 2024-04-28 VITALS
RESPIRATION RATE: 18 BRPM | TEMPERATURE: 98 F | HEART RATE: 88 BPM | DIASTOLIC BLOOD PRESSURE: 78 MMHG | OXYGEN SATURATION: 97 % | SYSTOLIC BLOOD PRESSURE: 130 MMHG

## 2024-04-28 VITALS
HEART RATE: 91 BPM | TEMPERATURE: 99 F | RESPIRATION RATE: 18 BRPM | WEIGHT: 164.91 LBS | DIASTOLIC BLOOD PRESSURE: 88 MMHG | SYSTOLIC BLOOD PRESSURE: 137 MMHG | OXYGEN SATURATION: 98 %

## 2024-04-28 DIAGNOSIS — Z98.890 OTHER SPECIFIED POSTPROCEDURAL STATES: Chronic | ICD-10-CM

## 2024-04-28 PROCEDURE — 71046 X-RAY EXAM CHEST 2 VIEWS: CPT

## 2024-04-28 PROCEDURE — 99284 EMERGENCY DEPT VISIT MOD MDM: CPT

## 2024-04-28 PROCEDURE — 99283 EMERGENCY DEPT VISIT LOW MDM: CPT | Mod: 25

## 2024-04-28 PROCEDURE — 71046 X-RAY EXAM CHEST 2 VIEWS: CPT | Mod: 26

## 2024-04-28 NOTE — ED ADULT NURSE NOTE - NS_NURSE_DISC_TEACHING_YN_ED_ALL_ED
Nursing Note by Chiquis Tejeda CMA at 03/12/18 01:30 PM     Author:  Chiquis Tejeda CMA Service:  (none) Author Type:  Certified Medical Assistant     Filed:  03/12/18 01:30 PM Encounter Date:  3/12/2018 Status:  Signed     :  Chiquis Tejeda CMA (Certified Medical Assistant)            If the provider orders any diagnostic testing at today's visit, the patient would prefer to be contacted by means of[MR1.1T] cell[MR1.1M].  If by phone their preferred phone number is[MR1.1T] [phone][MR1.1M] and it is[MR1.1T] ok[MR1.1M] to leave a detailed message on their voicemail or with a family member.  Patient is aware that if they are my chart active most of their test results will be auto released once the provider has viewed them.[MR1.1T]        Revision History        User Key Date/Time User Provider Type Action    > MR1.1 03/12/18 01:30 PM Chiquis Tejeda CMA Certified Medical Assistant Sign    M - Manual, T - Template            
Yes

## 2024-04-28 NOTE — ED PROVIDER NOTE - PROGRESS NOTE DETAILS
Patient feeling well, no acute symptoms.  Lungs are clear.  Discussed with patient and her aide regarding importance of close prompt follow-up this week.  Patient will return with any worsening or persistent breathing issues, cough, fever or any other issues or concerns.

## 2024-04-28 NOTE — ED ADULT NURSE NOTE - NSFALLHARMRISKINTERV_ED_ALL_ED

## 2024-04-28 NOTE — ED PROVIDER NOTE - NSFOLLOWUPINSTRUCTIONS_ED_ALL_ED_FT
1. Follow-up with your Primary Medical Doctor. Call tomorrow for prompt follow-up this week.  2. Return to Emergency room for any worsening or persistent pain, weakness, fever, or any other concerning symptoms.  3. See attached instruction sheets for additional information, including information regarding signs and symptoms to look out for, reasons to seek immediate care and other important instructions.  4.  Plenty of fluids  5.  Okay to return to program

## 2024-04-28 NOTE — ED PROVIDER NOTE - CLINICAL SUMMARY MEDICAL DECISION MAKING FREE TEXT BOX
Choking episode today, now essentially asymptomatic without difficulty breathing.  Will check x-ray, outpatient follow-up

## 2024-04-28 NOTE — ED ADULT NURSE NOTE - OBJECTIVE STATEMENT
pt is AOX3, at baseline, aide at bedside. Pt is from assisted facility and was sent via EMS s/p choking. Pt states she was eating eating food and started to choke. Heimlich maneuver given by staff and food was brought up. pt now has c/o throat pain when swallowing. No difficulty swallowing at this time. Pt has clear speech, no change in voice or tone. No signs of resp distress. Able to make needs known. denies c/o SOB/HA/CP. 97 % room air. pending radiology results. care ongoing.

## 2024-04-28 NOTE — ED ADULT NURSE NOTE - NURSING MUSC JOINTS
Reviewed PTA medication list with patient/caregiver and patient/caregiver denies any additional medications. Patient admits to having a responsible adult care for them for at least 24 hours after surgery.     Dual skin assessment completed by John JORDAN and CATERINA Steen RN. no pain, swelling or deformity of joints

## 2024-04-28 NOTE — ED PROVIDER NOTE - OBJECTIVE STATEMENT
48-year-old female with a history of autism spectrum disorder, mood disorder, adjustment disorder, anxiety presents with status post episode of choking today.  Patient was reportedly eating a burrito with sausage today, when she was choking on it.  She signaled to the staff who performed a Heimlich maneuver , bringing up the food.  The patient is now asymptomatic with exception of minimal discomfort when she swallows in the lower throat.  No chest pain or shortness of breath.  No abdominal pain.  No acute nausea or vomiting.  No aggravating or alleviating factors otherwise noted.  No other acute complaints.  This happened around 45 minutes prior to arrival.  No other acute complaints at this time.

## 2024-04-28 NOTE — ED PROVIDER NOTE - PRINCIPAL DIAGNOSIS
Patient returned vm regarding CBT-I therapy. Patient has been scheduled for 8 appointments beginning [email or web address] and ending [email or web address]. Confirmed insurance and informed patient to come in 15 prior to start of appointment time to fill out paperwork. Choking episode

## 2024-04-28 NOTE — ED ADULT NURSE NOTE - CAS DISCH BELONGINGS RETURNED
Hyperlipidemia    Hypothyroidism    Migraines    Osteoarthritis    PUD (peptic ulcer disease)  with ugi blding  Rotator cuff disorder  chronic injury Not applicable

## 2024-04-28 NOTE — ED PROVIDER NOTE - PATIENT PORTAL LINK FT
You can access the FollowMyHealth Patient Portal offered by [facility] by registering at the following website: [email or web address]. By joining [facility]’s FollowMyHealth portal, you will also be able to view your health information using other applications (apps) compatible with our system.

## 2024-05-01 NOTE — ED ADULT TRIAGE NOTE - BSA (M2)
ENDOCRINE MEDICAL ASSISTANT ROOMING NOTE  Is the patient here for diabetes mellitus: NO     Rooming documentation of social history includes tobacco screening only.  Medication list reviewed and updated.    PCP: Sandra Teran NP    Patient would like communication of their results via: [employer]    Refills Needed: No     1.96

## 2025-07-27 ENCOUNTER — EMERGENCY (EMERGENCY)
Facility: HOSPITAL | Age: 50
LOS: 1 days | End: 2025-07-27
Attending: EMERGENCY MEDICINE
Payer: MEDICARE

## 2025-07-27 VITALS
TEMPERATURE: 97 F | SYSTOLIC BLOOD PRESSURE: 98 MMHG | RESPIRATION RATE: 15 BRPM | WEIGHT: 179.9 LBS | OXYGEN SATURATION: 94 % | HEIGHT: 68 IN | DIASTOLIC BLOOD PRESSURE: 58 MMHG | HEART RATE: 101 BPM

## 2025-07-27 VITALS
OXYGEN SATURATION: 97 % | SYSTOLIC BLOOD PRESSURE: 132 MMHG | DIASTOLIC BLOOD PRESSURE: 85 MMHG | TEMPERATURE: 98 F | HEART RATE: 76 BPM | RESPIRATION RATE: 16 BRPM

## 2025-07-27 DIAGNOSIS — Z98.890 OTHER SPECIFIED POSTPROCEDURAL STATES: Chronic | ICD-10-CM

## 2025-07-27 LAB
ALBUMIN SERPL ELPH-MCNC: 3.4 G/DL — SIGNIFICANT CHANGE UP (ref 3.3–5)
ALP SERPL-CCNC: 117 U/L — SIGNIFICANT CHANGE UP (ref 40–120)
ALT FLD-CCNC: 17 U/L — SIGNIFICANT CHANGE UP (ref 12–78)
ANION GAP SERPL CALC-SCNC: 4 MMOL/L — LOW (ref 5–17)
AST SERPL-CCNC: 16 U/L — SIGNIFICANT CHANGE UP (ref 15–37)
BASOPHILS # BLD AUTO: 0.05 K/UL — SIGNIFICANT CHANGE UP (ref 0–0.2)
BASOPHILS NFR BLD AUTO: 0.7 % — SIGNIFICANT CHANGE UP (ref 0–2)
BILIRUB SERPL-MCNC: 0.3 MG/DL — SIGNIFICANT CHANGE UP (ref 0.2–1.2)
BUN SERPL-MCNC: 8 MG/DL — SIGNIFICANT CHANGE UP (ref 7–23)
CALCIUM SERPL-MCNC: 8.8 MG/DL — SIGNIFICANT CHANGE UP (ref 8.5–10.1)
CHLORIDE SERPL-SCNC: 111 MMOL/L — HIGH (ref 96–108)
CO2 SERPL-SCNC: 26 MMOL/L — SIGNIFICANT CHANGE UP (ref 22–31)
CREAT SERPL-MCNC: 0.83 MG/DL — SIGNIFICANT CHANGE UP (ref 0.5–1.3)
EGFR: 85 ML/MIN/1.73M2 — SIGNIFICANT CHANGE UP
EGFR: 85 ML/MIN/1.73M2 — SIGNIFICANT CHANGE UP
EOSINOPHIL # BLD AUTO: 0.23 K/UL — SIGNIFICANT CHANGE UP (ref 0–0.5)
EOSINOPHIL NFR BLD AUTO: 3.1 % — SIGNIFICANT CHANGE UP (ref 0–6)
GLUCOSE SERPL-MCNC: 91 MG/DL — SIGNIFICANT CHANGE UP (ref 70–99)
HCT VFR BLD CALC: 38.2 % — SIGNIFICANT CHANGE UP (ref 34.5–45)
HGB BLD-MCNC: 12.5 G/DL — SIGNIFICANT CHANGE UP (ref 11.5–15.5)
IMM GRANULOCYTES # BLD AUTO: 0.03 K/UL — SIGNIFICANT CHANGE UP (ref 0–0.07)
IMM GRANULOCYTES NFR BLD AUTO: 0.4 % — SIGNIFICANT CHANGE UP (ref 0–0.9)
LIDOCAIN IGE QN: 29 U/L — SIGNIFICANT CHANGE UP (ref 13–75)
LYMPHOCYTES # BLD AUTO: 2.09 K/UL — SIGNIFICANT CHANGE UP (ref 1–3.3)
LYMPHOCYTES NFR BLD AUTO: 28.1 % — SIGNIFICANT CHANGE UP (ref 13–44)
MCHC RBC-ENTMCNC: 29.2 PG — SIGNIFICANT CHANGE UP (ref 27–34)
MCHC RBC-ENTMCNC: 32.7 G/DL — SIGNIFICANT CHANGE UP (ref 32–36)
MCV RBC AUTO: 89.3 FL — SIGNIFICANT CHANGE UP (ref 80–100)
MONOCYTES # BLD AUTO: 0.69 K/UL — SIGNIFICANT CHANGE UP (ref 0–0.9)
MONOCYTES NFR BLD AUTO: 9.3 % — SIGNIFICANT CHANGE UP (ref 2–14)
NEUTROPHILS # BLD AUTO: 4.36 K/UL — SIGNIFICANT CHANGE UP (ref 1.8–7.4)
NEUTROPHILS NFR BLD AUTO: 58.4 % — SIGNIFICANT CHANGE UP (ref 43–77)
NRBC # BLD AUTO: 0 K/UL — SIGNIFICANT CHANGE UP (ref 0–0)
NRBC # FLD: 0 K/UL — SIGNIFICANT CHANGE UP (ref 0–0)
NRBC BLD AUTO-RTO: 0 /100 WBCS — SIGNIFICANT CHANGE UP (ref 0–0)
PLATELET # BLD AUTO: 325 K/UL — SIGNIFICANT CHANGE UP (ref 150–400)
PMV BLD: 9.3 FL — SIGNIFICANT CHANGE UP (ref 7–13)
POTASSIUM SERPL-MCNC: 4.1 MMOL/L — SIGNIFICANT CHANGE UP (ref 3.5–5.3)
POTASSIUM SERPL-SCNC: 4.1 MMOL/L — SIGNIFICANT CHANGE UP (ref 3.5–5.3)
PROT SERPL-MCNC: 7.1 G/DL — SIGNIFICANT CHANGE UP (ref 6–8.3)
RBC # BLD: 4.28 M/UL — SIGNIFICANT CHANGE UP (ref 3.8–5.2)
RBC # FLD: 14 % — SIGNIFICANT CHANGE UP (ref 10.3–14.5)
SODIUM SERPL-SCNC: 141 MMOL/L — SIGNIFICANT CHANGE UP (ref 135–145)
WBC # BLD: 7.45 K/UL — SIGNIFICANT CHANGE UP (ref 3.8–10.5)
WBC # FLD AUTO: 7.45 K/UL — SIGNIFICANT CHANGE UP (ref 3.8–10.5)

## 2025-07-27 PROCEDURE — 85025 COMPLETE CBC W/AUTO DIFF WBC: CPT

## 2025-07-27 PROCEDURE — 99283 EMERGENCY DEPT VISIT LOW MDM: CPT

## 2025-07-27 PROCEDURE — 83690 ASSAY OF LIPASE: CPT

## 2025-07-27 PROCEDURE — 36415 COLL VENOUS BLD VENIPUNCTURE: CPT

## 2025-07-27 PROCEDURE — 80053 COMPREHEN METABOLIC PANEL: CPT

## 2025-07-27 PROCEDURE — 99284 EMERGENCY DEPT VISIT MOD MDM: CPT

## 2025-07-27 RX ORDER — MAGNESIUM, ALUMINUM HYDROXIDE 200-200 MG
30 TABLET,CHEWABLE ORAL EVERY 4 HOURS
Refills: 0 | Status: DISCONTINUED | OUTPATIENT
Start: 2025-07-27 | End: 2025-07-31

## 2025-07-27 RX ORDER — LIDOCAINE HYDROCHLORIDE 20 MG/ML
1 JELLY TOPICAL ONCE
Refills: 0 | Status: COMPLETED | OUTPATIENT
Start: 2025-07-27 | End: 2025-07-27

## 2025-07-27 RX ADMIN — Medication 30 MILLILITER(S): at 17:46

## 2025-07-27 RX ADMIN — LIDOCAINE HYDROCHLORIDE 1 PATCH: 20 JELLY TOPICAL at 17:46

## 2025-07-27 NOTE — ED ADULT NURSE NOTE - OBJECTIVE STATEMENT
left sided flank rib pain radiates down to left hip and leg around to stomach up to throat. pt in no acute distress. pt updated on plan of care.

## 2025-07-27 NOTE — ED ADULT TRIAGE NOTE - TEMPERATURE IN CELSIUS (DEGREES C)
Please call the patient and inform her that her urine culture grew E coli bacteria just as before and it should be susceptible to the Cipro I prescribed - no changes to make, have her please follow up if not improving.    Florencio Betancourt MD   36

## 2025-07-27 NOTE — ED PROVIDER NOTE - CLINICAL SUMMARY MEDICAL DECISION MAKING FREE TEXT BOX
50-year-old female with extensive past medical history presenting for evaluation of abdominal pain.  Patient is poor historian, unable to describe exact pain initially began,  states either began last night or this morning or several weeks ago.   Patient lives in a group home, group home attendant states  patient called 911 as attention seeking, has done this in the past, describes patient began acting out few days ago after  another resident in a group home called 911 and was taken to her home, and patient has expressed would like the same to be done for her.    Patient at this time describes has left-sided  flank pain worse with movement and deep palpation, as well as straining in the bathroom, describes occasional radiation of pain to upper abdomen.  Has been able to eat and drink at baseline, has had normal bowel movements over this time.  No fevers, chills, nausea, vomiting, diarrhea, constipation, lightheadedness, dizziness.     PE:  General: Nontoxic, alert, no distress, answers appropriately  HEENT: NCAT, PERRLA, EOMI, moist mucous membranes  Neck/back: No midline tenderness to palpation, no spinal step-off or deformity, full ROM  Cardiovascular: RRR, 2+ peripheral pulses, cap refill less than 2 seconds, no edema  Lungs/respiratory: No respiratory distress, speaking full sentences, no stridor  GI/abdomen: Symmetric, soft, no TTP  : No CVA TTP, no rash  MSK/extremities: WWP, no acute deformity  Skin: Warm, dry  Neurological: GCS 15, CN II -XII grossly intact, 5 / 5 strength throughout, stable gait    MDM: 50-year-old female with extensive past medical history presenting for evaluation of abdominal pain.  Patient is poor historian, unable to describe exact pain initially began,  states either began last night or this morning or several weeks ago.   Patient lives in a group home, group home attendant states  patient called 911 as attention seeking, has done this in the past, describes patient began acting out few days ago after  another resident in a group home called 911 and was taken to her home, and patient has expressed would like the same to be done for her.    Patient at this time describes has left-sided  flank pain worse with movement and deep palpation, as well as straining in the bathroom, describes occasional radiation of pain to upper abdomen.  Has been able to eat and drink at baseline, has had normal bowel movements over this time.  No fevers, chills, nausea, vomiting, diarrhea, constipation, lightheadedness, dizziness.     PE:  General: Nontoxic, alert, no distress, answers tangentially  HEENT: NCAT, PERRLA, EOMI, moist mucous membranes  Neck/back: No midline tenderness to palpation, no spinal step-off or deformity, full ROM  Cardiovascular: RRR, 2+ peripheral pulses, cap refill less than 2 seconds, no edema  Lungs/respiratory: No respiratory distress, speaking full sentences, no stridor, CTAB  GI/abdomen: Symmetric, soft, no TTP  : No CVA TTP, no rash  MSK/extremities: WWP, no acute deformity  Skin: Warm, dry  Neurological: GCS 15, CN II -XII grossly intact, 5 / 5 strength throughout    MDM:  50-year-old female with past medical history as above presenting for evaluation of left side pain  radiating to abdomen.  based on history, low index of suspicion for intra-abdominal etiology of abdominal pain.   differential diagnoses but includes but is not limited to GERD, pancreatitis, fictitious abdominal pain. Will  evaluate for  metabolic derangement, pancreatitis, significant anemia.  symptomatic relief with lidocaine patch and Maalox. Anticipate discharge home

## 2025-07-27 NOTE — ED ADULT NURSE REASSESSMENT NOTE - NS ED NURSE REASSESS COMMENT FT1
Received pt from Mendoza RN.  Pt resting comfortably in bed at this time, offers no complaints.  Pain resolved at this time.  Pt to dc home; pt expressed understanding of dc instructions.  DC papers given to DSP at bedside to bring back to group home.

## 2025-07-27 NOTE — ED PROVIDER NOTE - PATIENT PORTAL LINK FT
You can access the FollowMyHealth Patient Portal offered by [facility] by registering at the following website: [email or web address]. By joining [facility]’s FollowMyHealth portal, you will also be able to view your health information using other applications (apps) compatible with our system.

## 2025-07-27 NOTE — ED PROVIDER NOTE - NSFOLLOWUPINSTRUCTIONS_ED_ALL_ED_FT
Today, you were seen in the ER for abdominal pain. Your bloodwork was reassuring. However, things can change, and you should call 911 or return to the ER if you experience:  -vomiting that prevents you from keeping down fluids or medications  -worsening of your pain  -fever of 100.4 or higher  -any other new or concerning symptoms  Otherwise, please see your primary doctor in 1-2 days for reevaluation. Call for an appointment today.    At this time, you can return to all group activities.

## 2025-07-27 NOTE — ED ADULT NURSE NOTE - NSFALLRISKINTERV_ED_ALL_ED
Assistance OOB with selected safe patient handling equipment if applicable/Communicate fall risk and risk factors to all staff, patient, and family/Provide visual cue: yellow wristband, yellow gown, etc/Reinforce activity limits and safety measures with patient and family/Toileting schedule using arm’s reach rule for commode and bathroom/Call bell, personal items and telephone in reach/Instruct patient to call for assistance before getting out of bed/chair/stretcher/Non-slip footwear applied when patient is off stretcher/[place] to call system/Physically safe environment - no spills, clutter or unnecessary equipment/Purposeful Proactive Rounding/Room/bathroom lighting operational, light cord in reach

## 2025-07-27 NOTE — ED ADULT NURSE REASSESSMENT NOTE - NSFALLUNIVINTERV_ED_ALL_ED
Bed/Stretcher in lowest position, wheels locked, appropriate side rails in place/Call bell, personal items and telephone in reach/Instruct patient to call for assistance before getting out of bed/chair/stretcher/Non-slip footwear applied when patient is off stretcher/[place] to call system/Physically safe environment - no spills, clutter or unnecessary equipment/Purposeful proactive rounding/Room/bathroom lighting operational, light cord in reach

## 2025-07-27 NOTE — ED PROVIDER NOTE - PROGRESS NOTE DETAILS
Patient reassessed, feels improved, feels prepared for discharge on return to group activities.  Will discharge home with return precautions

## (undated) DEVICE — ELCTR BOVIE TIP NEEDLE INSULATED 2.8" EDGE

## (undated) DEVICE — MEDICATION LABELS W MARKER

## (undated) DEVICE — WARMING BLANKET LOWER ADULT

## (undated) DEVICE — SOL IRR POUR H2O 250ML

## (undated) DEVICE — SOL IRR POUR NS 0.9% 500ML

## (undated) DEVICE — VENODYNE/SCD SLEEVE CALF MEDIUM

## (undated) DEVICE — ELCTR BOVIE PENCIL SMOKE EVACUATION

## (undated) DEVICE — PACK DENTAL

## (undated) DEVICE — SUT CHROMIC 3-0 30" C-13

## (undated) DEVICE — DRAPE TOWEL BLUE 17" X 24"

## (undated) DEVICE — POSITIONER FOAM EGG CRATE ULNAR 2PCS (PINK)

## (undated) DEVICE — DRAPE INSTRUMENT POUCH 6.75" X 11"

## (undated) DEVICE — DRAPE MAGNETIC INSTRUMENT MEDIUM

## (undated) DEVICE — SPONGE END-STRUNG CYLINDRICAL .5X1.5"

## (undated) DEVICE — GLV 6 PROTEXIS (BLUE)